# Patient Record
Sex: FEMALE | Race: WHITE | NOT HISPANIC OR LATINO | Employment: OTHER | URBAN - METROPOLITAN AREA
[De-identification: names, ages, dates, MRNs, and addresses within clinical notes are randomized per-mention and may not be internally consistent; named-entity substitution may affect disease eponyms.]

---

## 2023-06-08 LAB
ALANINE AMINOTRANSFERASE (SGPT) (U/L) IN SER/PLAS: 13 U/L (ref 7–45)
ALBUMIN (G/DL) IN SER/PLAS: 4.1 G/DL (ref 3.4–5)
ALKALINE PHOSPHATASE (U/L) IN SER/PLAS: 80 U/L (ref 33–136)
ANION GAP IN SER/PLAS: 12 MMOL/L (ref 10–20)
ASPARTATE AMINOTRANSFERASE (SGOT) (U/L) IN SER/PLAS: 16 U/L (ref 9–39)
BASOPHILS (10*3/UL) IN BLOOD BY AUTOMATED COUNT: 0.05 X10E9/L (ref 0–0.1)
BASOPHILS/100 LEUKOCYTES IN BLOOD BY AUTOMATED COUNT: 0.4 % (ref 0–2)
BILIRUBIN TOTAL (MG/DL) IN SER/PLAS: 1.2 MG/DL (ref 0–1.2)
CALCIDIOL (25 OH VITAMIN D3) (NG/ML) IN SER/PLAS: 51 NG/ML
CALCIUM (MG/DL) IN SER/PLAS: 9.8 MG/DL (ref 8.6–10.3)
CARBON DIOXIDE, TOTAL (MMOL/L) IN SER/PLAS: 32 MMOL/L (ref 21–32)
CHLORIDE (MMOL/L) IN SER/PLAS: 101 MMOL/L (ref 98–107)
CHOLESTEROL (MG/DL) IN SER/PLAS: 234 MG/DL (ref 0–199)
CHOLESTEROL IN HDL (MG/DL) IN SER/PLAS: 48.3 MG/DL
CHOLESTEROL/HDL RATIO: 4.8
COBALAMIN (VITAMIN B12) (PG/ML) IN SER/PLAS: 178 PG/ML (ref 211–911)
CREATININE (MG/DL) IN SER/PLAS: 0.57 MG/DL (ref 0.5–1.05)
EOSINOPHILS (10*3/UL) IN BLOOD BY AUTOMATED COUNT: 0.13 X10E9/L (ref 0–0.7)
EOSINOPHILS/100 LEUKOCYTES IN BLOOD BY AUTOMATED COUNT: 1 % (ref 0–6)
ERYTHROCYTE DISTRIBUTION WIDTH (RATIO) BY AUTOMATED COUNT: 13.3 % (ref 11.5–14.5)
ERYTHROCYTE MEAN CORPUSCULAR HEMOGLOBIN CONCENTRATION (G/DL) BY AUTOMATED: 32.1 G/DL (ref 32–36)
ERYTHROCYTE MEAN CORPUSCULAR VOLUME (FL) BY AUTOMATED COUNT: 95 FL (ref 80–100)
ERYTHROCYTES (10*6/UL) IN BLOOD BY AUTOMATED COUNT: 5.32 X10E12/L (ref 4–5.2)
FOLATE (NG/ML) IN SER/PLAS: 14.4 NG/ML
GFR FEMALE: >90 ML/MIN/1.73M2
GLUCOSE (MG/DL) IN SER/PLAS: 96 MG/DL (ref 74–99)
HEMATOCRIT (%) IN BLOOD BY AUTOMATED COUNT: 50.4 % (ref 36–46)
HEMOGLOBIN (G/DL) IN BLOOD: 16.2 G/DL (ref 12–16)
IMMATURE GRANULOCYTES/100 LEUKOCYTES IN BLOOD BY AUTOMATED COUNT: 0.4 % (ref 0–0.9)
LDL: 155 MG/DL (ref 0–99)
LEUKOCYTES (10*3/UL) IN BLOOD BY AUTOMATED COUNT: 12.6 X10E9/L (ref 4.4–11.3)
LYMPHOCYTES (10*3/UL) IN BLOOD BY AUTOMATED COUNT: 3.31 X10E9/L (ref 1.2–4.8)
LYMPHOCYTES/100 LEUKOCYTES IN BLOOD BY AUTOMATED COUNT: 26.3 % (ref 13–44)
MONOCYTES (10*3/UL) IN BLOOD BY AUTOMATED COUNT: 0.63 X10E9/L (ref 0.1–1)
MONOCYTES/100 LEUKOCYTES IN BLOOD BY AUTOMATED COUNT: 5 % (ref 2–10)
NEUTROPHILS (10*3/UL) IN BLOOD BY AUTOMATED COUNT: 8.41 X10E9/L (ref 1.2–7.7)
NEUTROPHILS/100 LEUKOCYTES IN BLOOD BY AUTOMATED COUNT: 66.9 % (ref 40–80)
PLATELETS (10*3/UL) IN BLOOD AUTOMATED COUNT: 358 X10E9/L (ref 150–450)
POTASSIUM (MMOL/L) IN SER/PLAS: 4.4 MMOL/L (ref 3.5–5.3)
PROTEIN TOTAL: 7.5 G/DL (ref 6.4–8.2)
SODIUM (MMOL/L) IN SER/PLAS: 141 MMOL/L (ref 136–145)
THYROTROPIN (MIU/L) IN SER/PLAS BY DETECTION LIMIT <= 0.05 MIU/L: 1.32 MIU/L (ref 0.44–3.98)
TRIGLYCERIDE (MG/DL) IN SER/PLAS: 156 MG/DL (ref 0–149)
UREA NITROGEN (MG/DL) IN SER/PLAS: 10 MG/DL (ref 6–23)
VLDL: 31 MG/DL (ref 0–40)

## 2023-07-11 ENCOUNTER — HOSPITAL ENCOUNTER (OUTPATIENT)
Dept: DATA CONVERSION | Facility: HOSPITAL | Age: 63
End: 2023-07-11
Attending: SURGERY | Admitting: SURGERY
Payer: COMMERCIAL

## 2023-07-11 DIAGNOSIS — D12.2 BENIGN NEOPLASM OF ASCENDING COLON: ICD-10-CM

## 2023-07-11 DIAGNOSIS — Z91.041 RADIOGRAPHIC DYE ALLERGY STATUS: ICD-10-CM

## 2023-07-11 DIAGNOSIS — I25.10 ATHEROSCLEROTIC HEART DISEASE OF NATIVE CORONARY ARTERY WITHOUT ANGINA PECTORIS: ICD-10-CM

## 2023-07-11 DIAGNOSIS — J44.9 CHRONIC OBSTRUCTIVE PULMONARY DISEASE, UNSPECIFIED (MULTI): ICD-10-CM

## 2023-07-11 DIAGNOSIS — K21.9 GASTRO-ESOPHAGEAL REFLUX DISEASE WITHOUT ESOPHAGITIS: ICD-10-CM

## 2023-07-11 DIAGNOSIS — E66.9 OBESITY, UNSPECIFIED: ICD-10-CM

## 2023-07-11 DIAGNOSIS — F17.200 NICOTINE DEPENDENCE, UNSPECIFIED, UNCOMPLICATED: ICD-10-CM

## 2023-07-11 DIAGNOSIS — Z12.11 ENCOUNTER FOR SCREENING FOR MALIGNANT NEOPLASM OF COLON: ICD-10-CM

## 2023-07-11 DIAGNOSIS — I10 ESSENTIAL (PRIMARY) HYPERTENSION: ICD-10-CM

## 2023-07-11 DIAGNOSIS — R19.4 CHANGE IN BOWEL HABIT: ICD-10-CM

## 2023-07-11 DIAGNOSIS — E78.5 HYPERLIPIDEMIA, UNSPECIFIED: ICD-10-CM

## 2023-07-14 LAB
COMPLETE PATHOLOGY REPORT: NORMAL
CONVERTED CLINICAL DIAGNOSIS-HISTORY: NORMAL
CONVERTED FINAL DIAGNOSIS: NORMAL
CONVERTED FINAL REPORT PDF LINK TO COPY AND PASTE: NORMAL
CONVERTED GROSS DESCRIPTION: NORMAL

## 2023-09-15 PROBLEM — D12.6 SERRATED ADENOMA OF COLON: Status: ACTIVE | Noted: 2023-09-15

## 2023-09-15 PROBLEM — E78.2 MIXED HYPERLIPIDEMIA: Status: ACTIVE | Noted: 2023-09-15

## 2023-09-15 PROBLEM — R19.4 CHANGE IN BOWEL HABITS: Status: ACTIVE | Noted: 2023-09-15

## 2023-09-15 PROBLEM — I25.84 CORONARY ARTERY CALCIFICATION: Status: ACTIVE | Noted: 2023-09-15

## 2023-09-15 PROBLEM — I25.10 CORONARY ARTERY CALCIFICATION: Status: ACTIVE | Noted: 2023-09-15

## 2023-09-15 PROBLEM — E66.9 CLASS 2 OBESITY WITH BODY MASS INDEX (BMI) OF 39.0 TO 39.9 IN ADULT: Status: ACTIVE | Noted: 2023-09-15

## 2023-09-15 PROBLEM — E66.812 CLASS 2 OBESITY WITH BODY MASS INDEX (BMI) OF 39.0 TO 39.9 IN ADULT: Status: ACTIVE | Noted: 2023-09-15

## 2023-09-15 PROBLEM — E78.5 HYPERLIPIDEMIA: Status: ACTIVE | Noted: 2023-09-15

## 2023-09-15 PROBLEM — R91.8 PULMONARY NODULES: Status: ACTIVE | Noted: 2023-09-15

## 2023-09-15 PROBLEM — E78.5 HYPERLIPIDEMIA WITH TARGET LDL LESS THAN 100: Status: ACTIVE | Noted: 2023-09-15

## 2023-09-15 PROBLEM — J43.9 MILD EMPHYSEMA (MULTI): Status: ACTIVE | Noted: 2023-09-15

## 2023-09-15 RX ORDER — PANTOPRAZOLE SODIUM 40 MG/1
40 TABLET, DELAYED RELEASE ORAL
COMMUNITY
End: 2024-03-01 | Stop reason: SDUPTHER

## 2023-09-15 RX ORDER — ERGOCALCIFEROL 1.25 MG/1
1.25 CAPSULE ORAL
COMMUNITY

## 2023-09-15 RX ORDER — LOSARTAN POTASSIUM AND HYDROCHLOROTHIAZIDE 25; 100 MG/1; MG/1
1 TABLET ORAL DAILY
COMMUNITY
End: 2024-02-01 | Stop reason: SDUPTHER

## 2023-09-15 RX ORDER — ROSUVASTATIN CALCIUM 10 MG/1
10 TABLET, COATED ORAL DAILY
COMMUNITY
End: 2023-11-08 | Stop reason: DRUGHIGH

## 2023-09-30 NOTE — H&P
History & Physical Reviewed:   I have reviewed the History and Physical dated:  11-Jul-2023   History and Physical reviewed and relevant findings noted. Patient examined to review pertinent physical  findings.: No significant changes   Home Medications Reviewed: no changes noted   Allergies Reviewed: no changes noted       ERAS (Enhanced Recovery After Surgery):  ·  ERAS Patient: no     Consent:   COVID-19 Consent:  ·  COVID-19 Risk Consent Surgeon has reviewed key risks related to the risk of jose j COVID-19 and if they contract COVID-19 what the risks are.       Electronic Signatures:  Maikol Morse)  (Signed 11-Jul-2023 10:28)   Authored: History & Physical Reviewed, ERAS, Consent,  Note Completion      Last Updated: 11-Jul-2023 10:28 by Maikol Morse)

## 2023-10-17 ENCOUNTER — APPOINTMENT (OUTPATIENT)
Dept: PRIMARY CARE | Facility: CLINIC | Age: 63
End: 2023-10-17
Payer: COMMERCIAL

## 2023-11-06 ENCOUNTER — LAB (OUTPATIENT)
Dept: LAB | Facility: LAB | Age: 63
End: 2023-11-06
Payer: COMMERCIAL

## 2023-11-06 ENCOUNTER — APPOINTMENT (OUTPATIENT)
Dept: PRIMARY CARE | Facility: CLINIC | Age: 63
End: 2023-11-06
Payer: COMMERCIAL

## 2023-11-06 DIAGNOSIS — Z80.6 FAMILY HISTORY OF LEUKEMIA: ICD-10-CM

## 2023-11-06 DIAGNOSIS — E78.5 HYPERLIPIDEMIA, UNSPECIFIED: Primary | ICD-10-CM

## 2023-11-06 DIAGNOSIS — E78.5 HYPERLIPIDEMIA, UNSPECIFIED: ICD-10-CM

## 2023-11-06 LAB
ALT SERPL W P-5'-P-CCNC: 10 U/L (ref 7–45)
AST SERPL W P-5'-P-CCNC: 11 U/L (ref 9–39)
BASOPHILS # BLD AUTO: 0.07 X10*3/UL (ref 0–0.1)
BASOPHILS NFR BLD AUTO: 0.5 %
CHOLEST SERPL-MCNC: 235 MG/DL (ref 0–199)
CHOLESTEROL/HDL RATIO: 5.2
EOSINOPHIL # BLD AUTO: 0.12 X10*3/UL (ref 0–0.7)
EOSINOPHIL NFR BLD AUTO: 0.9 %
ERYTHROCYTE [DISTWIDTH] IN BLOOD BY AUTOMATED COUNT: 13.5 % (ref 11.5–14.5)
HCT VFR BLD AUTO: 51.1 % (ref 36–46)
HDLC SERPL-MCNC: 45.3 MG/DL
HGB BLD-MCNC: 16.4 G/DL (ref 12–16)
IMM GRANULOCYTES # BLD AUTO: 0.06 X10*3/UL (ref 0–0.7)
IMM GRANULOCYTES NFR BLD AUTO: 0.4 % (ref 0–0.9)
LDLC SERPL CALC-MCNC: 150 MG/DL
LYMPHOCYTES # BLD AUTO: 3.19 X10*3/UL (ref 1.2–4.8)
LYMPHOCYTES NFR BLD AUTO: 22.9 %
MCH RBC QN AUTO: 30 PG (ref 26–34)
MCHC RBC AUTO-ENTMCNC: 32.1 G/DL (ref 32–36)
MCV RBC AUTO: 94 FL (ref 80–100)
MONOCYTES # BLD AUTO: 0.68 X10*3/UL (ref 0.1–1)
MONOCYTES NFR BLD AUTO: 4.9 %
NEUTROPHILS # BLD AUTO: 9.79 X10*3/UL (ref 1.2–7.7)
NEUTROPHILS NFR BLD AUTO: 70.4 %
NON HDL CHOLESTEROL: 190 MG/DL (ref 0–149)
NRBC BLD-RTO: 0 /100 WBCS (ref 0–0)
PLATELET # BLD AUTO: 346 X10*3/UL (ref 150–450)
RBC # BLD AUTO: 5.46 X10*6/UL (ref 4–5.2)
TRIGL SERPL-MCNC: 197 MG/DL (ref 0–149)
VLDL: 39 MG/DL (ref 0–40)
WBC # BLD AUTO: 13.9 X10*3/UL (ref 4.4–11.3)

## 2023-11-06 PROCEDURE — 80061 LIPID PANEL: CPT

## 2023-11-06 PROCEDURE — 36415 COLL VENOUS BLD VENIPUNCTURE: CPT

## 2023-11-06 PROCEDURE — 84460 ALANINE AMINO (ALT) (SGPT): CPT

## 2023-11-06 PROCEDURE — 85025 COMPLETE CBC W/AUTO DIFF WBC: CPT

## 2023-11-06 PROCEDURE — 84450 TRANSFERASE (AST) (SGOT): CPT

## 2023-11-07 PROBLEM — Z00.00 INITIAL MEDICARE ANNUAL WELLNESS VISIT: Status: ACTIVE | Noted: 2023-11-07

## 2023-11-07 PROBLEM — Z71.2 ENCOUNTER TO DISCUSS TEST RESULTS: Status: ACTIVE | Noted: 2023-11-07

## 2023-11-08 ENCOUNTER — OFFICE VISIT (OUTPATIENT)
Dept: PRIMARY CARE | Facility: CLINIC | Age: 63
End: 2023-11-08
Payer: COMMERCIAL

## 2023-11-08 VITALS
DIASTOLIC BLOOD PRESSURE: 70 MMHG | TEMPERATURE: 98 F | HEART RATE: 71 BPM | SYSTOLIC BLOOD PRESSURE: 120 MMHG | HEIGHT: 60 IN | WEIGHT: 204 LBS | OXYGEN SATURATION: 93 % | BODY MASS INDEX: 40.05 KG/M2

## 2023-11-08 DIAGNOSIS — R07.9 CHEST PAIN, UNSPECIFIED TYPE: ICD-10-CM

## 2023-11-08 DIAGNOSIS — F17.200 NICOTINE DEPENDENCE WITH CURRENT USE: ICD-10-CM

## 2023-11-08 DIAGNOSIS — Z71.2 ENCOUNTER TO DISCUSS TEST RESULTS: ICD-10-CM

## 2023-11-08 DIAGNOSIS — G89.29 CHRONIC PAIN OF RIGHT KNEE: ICD-10-CM

## 2023-11-08 DIAGNOSIS — Z76.0 ENCOUNTER FOR MEDICATION REFILL: ICD-10-CM

## 2023-11-08 DIAGNOSIS — Z00.00 INITIAL MEDICARE ANNUAL WELLNESS VISIT: Primary | ICD-10-CM

## 2023-11-08 DIAGNOSIS — E78.2 MIXED HYPERLIPIDEMIA: ICD-10-CM

## 2023-11-08 DIAGNOSIS — J01.00 ACUTE NON-RECURRENT MAXILLARY SINUSITIS: ICD-10-CM

## 2023-11-08 DIAGNOSIS — M25.561 CHRONIC PAIN OF RIGHT KNEE: ICD-10-CM

## 2023-11-08 DIAGNOSIS — J43.9 MILD EMPHYSEMA (MULTI): ICD-10-CM

## 2023-11-08 DIAGNOSIS — R94.31 ABNORMAL EKG: ICD-10-CM

## 2023-11-08 PROCEDURE — G0438 PPPS, INITIAL VISIT: HCPCS | Performed by: FAMILY MEDICINE

## 2023-11-08 PROCEDURE — 1170F FXNL STATUS ASSESSED: CPT | Performed by: FAMILY MEDICINE

## 2023-11-08 PROCEDURE — 99214 OFFICE O/P EST MOD 30 MIN: CPT | Performed by: FAMILY MEDICINE

## 2023-11-08 PROCEDURE — 1159F MED LIST DOCD IN RCRD: CPT | Performed by: FAMILY MEDICINE

## 2023-11-08 PROCEDURE — 3008F BODY MASS INDEX DOCD: CPT | Performed by: FAMILY MEDICINE

## 2023-11-08 RX ORDER — ROSUVASTATIN CALCIUM 5 MG/1
5 TABLET, COATED ORAL 3 TIMES WEEKLY
Qty: 12 TABLET | Refills: 5 | Status: SHIPPED | OUTPATIENT
Start: 2023-11-08 | End: 2024-05-06

## 2023-11-08 RX ORDER — ALBUTEROL SULFATE 90 UG/1
2 AEROSOL, METERED RESPIRATORY (INHALATION) EVERY 4 HOURS PRN
Qty: 8 G | Refills: 5 | Status: SHIPPED | OUTPATIENT
Start: 2023-11-08 | End: 2024-11-07

## 2023-11-08 RX ORDER — REGADENOSON 0.08 MG/ML
0.4 INJECTION, SOLUTION INTRAVENOUS
Status: CANCELLED | OUTPATIENT
Start: 2023-11-08

## 2023-11-08 RX ORDER — ROSUVASTATIN CALCIUM 5 MG/1
5 TABLET, COATED ORAL DAILY
Qty: 30 TABLET | Refills: 5 | Status: SHIPPED | OUTPATIENT
Start: 2023-11-08 | End: 2023-11-08 | Stop reason: SDUPTHER

## 2023-11-08 RX ORDER — AMOXICILLIN AND CLAVULANATE POTASSIUM 875; 125 MG/1; MG/1
875 TABLET, FILM COATED ORAL 2 TIMES DAILY
Qty: 20 TABLET | Refills: 0 | Status: SHIPPED | OUTPATIENT
Start: 2023-11-08 | End: 2023-11-18

## 2023-11-08 ASSESSMENT — ACTIVITIES OF DAILY LIVING (ADL)
MANAGING_FINANCES: INDEPENDENT
GROCERY_SHOPPING: INDEPENDENT
BATHING: INDEPENDENT
DRESSING: INDEPENDENT
DOING_HOUSEWORK: INDEPENDENT
TAKING_MEDICATION: INDEPENDENT

## 2023-11-08 ASSESSMENT — ENCOUNTER SYMPTOMS
OCCASIONAL FEELINGS OF UNSTEADINESS: 0
LOSS OF SENSATION IN FEET: 0
DEPRESSION: 0

## 2023-11-08 ASSESSMENT — PATIENT HEALTH QUESTIONNAIRE - PHQ9
1. LITTLE INTEREST OR PLEASURE IN DOING THINGS: NOT AT ALL
SUM OF ALL RESPONSES TO PHQ9 QUESTIONS 1 AND 2: 0
2. FEELING DOWN, DEPRESSED OR HOPELESS: NOT AT ALL

## 2023-11-08 NOTE — ASSESSMENT & PLAN NOTE
Ordering a Lexiscan test.  Patient is unable to walk on a treadmill due to knee arthritis and respiratory status.

## 2023-11-08 NOTE — PROGRESS NOTES
Subjective :  Chief Complaint: Joanne Quiroga is an 63 y.o. female here for an annual wellness visit and general medical care and f/u.     HPI:  Here for an initial welcome to medicare visit.  Recently had labs done and we will review these together.  Was on rosuvastatin for ~2 weeks and had joint and muscle pains.  Is willing to try a very low intermittent dose.  Ophthalmology apt needed.   Dental apt - has upper plates.  Smoking 6-7 cigarettes per day (1 pack per 3 days).  Started at 22 y.o.     Had smoked a pack a day until about a year ago.  Not ready to quit.  Would like a refill on albuterol medicine that she has used in the past.  Is also complaining of sinus pressure and congestion that has not improved.  No fever.  Complaining of brief episodes of left upper chest discomfort.  No diaphoresis, no dyspnea.  No specific activity related to symptoms.  No current chest pain.        Review of Systems  All systems reviewed and negative except for what was mentioned in the HPI    Objective   /70 (BP Location: Left arm, Patient Position: Sitting, BP Cuff Size: Large adult)   Pulse 71   Temp 36.7 °C (98 °F) (Temporal)   Ht 1.524 m (5')   Wt 92.5 kg (204 lb)   SpO2 93% Comment: RA  BMI 39.84 kg/m²     Physical Exam  General:  Alert, oriented, no acute distress  Eyes:  Sclerae white, PER, conjunctivae clear  ENT:  No nasal congestion.    Neck: Supple  Endocrine:  No thyromegaly. No thyroid nodes.   Respiratory:  Normal breath sounds.  No wheezing, rhonchi nor crackles.  No dyspnea.  Cardiovascular:  S1 and S2 positive.  Regular rate and rhythm.  No gallops.  No murmurs.  Vascular:  No edema.  Skin warm and dry.  CNS:  No gross neurological deficits.  Gait within normal limits.    Psychiatric:  Affect is positive and appropriate.  No depression.  No anxiety.    Imaging:  No results found.     Labs reviewed:    Lab Results   Component Value Date    WBC 13.9 (H) 11/06/2023    RBC 5.46 (H) 11/06/2023    HGB  16.4 (H) 11/06/2023    HCT 51.1 (H) 11/06/2023    MCV 94 11/06/2023     11/06/2023    CHOL 235 (H) 11/06/2023    TRIG 197 (H) 11/06/2023    HDL 45.3 11/06/2023    ALT 10 11/06/2023    AST 11 11/06/2023     06/08/2023    K 4.4 06/08/2023     06/08/2023    CREATININE 0.57 06/08/2023    BUN 10 06/08/2023    CO2 32 06/08/2023    TSH 1.32 06/08/2023       Past Medical, Surgical, and Family History reviewed and updated in chart.    I have reviewed and reconciled the medication list with the patient today.   Current Outpatient Medications:     ergocalciferol (Vitamin D-2) 1.25 MG (01122 UT) capsule, Take 1 capsule (1,250 mcg) by mouth 1 (one) time per week., Disp: , Rfl:     losartan-hydrochlorothiazide (Hyzaar) 100-25 mg tablet, Take 1 tablet by mouth once daily., Disp: , Rfl:     pantoprazole (ProtoNix) 40 mg EC tablet, Take 1 tablet (40 mg) by mouth 2 times a day before meals. Do not crush, chew, or split. 30 minutes before meals, Disp: , Rfl:     vit B complex no.12/niacin,B3, (VITAMIN B COMPLEX NO.12-NIACIN ORAL), Take 1 tablet by mouth once daily., Disp: , Rfl:     albuterol (Ventolin HFA) 90 mcg/actuation inhaler, Inhale 2 puffs every 4 hours if needed for wheezing or shortness of breath., Disp: 8 g, Rfl: 5    amoxicillin-pot clavulanate (Augmentin) 875-125 mg tablet, Take 1 tablet (875 mg) by mouth 2 times a day for 10 days., Disp: 20 tablet, Rfl: 0    rosuvastatin (Crestor) 5 mg tablet, Take 1 tablet (5 mg) by mouth 3 times a week., Disp: 12 tablet, Rfl: 5     List of current healthcare providers:  Patient Care Team:  Rosita Singh MD as PCP - General     Assessment/Plan :  Problem List Items Addressed This Visit       Abnormal EKG     Ordering a Lexiscan test.  Patient is unable to walk on a treadmill due to knee arthritis and respiratory status.         Relevant Orders    CBC and Auto Differential    Lipid Panel    Acute non-recurrent maxillary sinusitis     Antibiotic ordered.          Relevant Medications    amoxicillin-pot clavulanate (Augmentin) 875-125 mg tablet    BMI 39.0-39.9,adult    Relevant Orders    Vitamin D 25-Hydroxy,Total (for eval of Vitamin D levels)    Chest pain     Intermittent symptoms.  Ordering a stress test.         Relevant Orders    Nuclear Stress Test    Chronic pain of right knee     No change in management at this time.           Encounter for medication refill     Med reviewed.  Refill given.         Encounter to discuss test results     Reviewed lab/testing results with the patient and provided patient education regarding the results.             Initial Medicare annual wellness visit - Primary     Age appropriate preventive measures reviewed and discussed.  Diet and exercise recommendations discussed.             Mild emphysema (CMS/HCC)     Strongly encouraged smoking cessation.         Relevant Medications    albuterol (Ventolin HFA) 90 mcg/actuation inhaler    Mixed hyperlipidemia     Was unable to tolerate 10 mg rosuvastatin daily.  Will restart at a lower dose.         Relevant Medications    rosuvastatin (Crestor) 5 mg tablet    Other Relevant Orders    Comprehensive Metabolic Panel    Lipid Panel    Nicotine dependence with current use     Strongly encouraged patient to stop smoking.  She is contemplating this.          The following health maintenance schedule was reviewed with the patient and provided in printed form in the after visit summary:  Health Maintenance   Topic Date Due    Medicare Initial Physical (IPPE)  Never done    Bone Density Scan  Never done    MMR Vaccines (1 of 1 - Standard series) Never done    Hepatitis C Screening  Never done    Diabetes Screening  Never done    Cervical Cancer Screening  Never done    DTaP/Tdap/Td Vaccines (1 - Tdap) 03/12/2010    Pneumococcal Vaccine: 65+ Years (2 - PPSV23 or PCV20) 08/28/2021    COVID-19 Vaccine (2 - Pfizer risk series) 01/27/2023    Influenza Vaccine (1) 09/01/2023    Mammogram  06/12/2024     Lipid Panel  11/06/2028    Colorectal Cancer Screening  07/11/2033    Zoster Vaccines  Completed    HIB Vaccines  Aged Out    Hepatitis B Vaccines  Aged Out    IPV Vaccines  Aged Out    Hepatitis A Vaccines  Aged Out    Meningococcal Vaccine  Aged Out    Rotavirus Vaccines  Aged Out    HPV Vaccines  Aged Out    Irritable Bowel Syndrome  Discontinued       Advance Care Planning   No living will or POA.        Reviewed lab/testing results with the patient and provided patient education regarding the results.  Continue other medications as listed  Follow up in 6 months.  Will order stress test at this time.

## 2023-11-08 NOTE — ASSESSMENT & PLAN NOTE
Reviewed lab/testing results with the patient and provided patient education regarding the results.

## 2023-11-08 NOTE — ASSESSMENT & PLAN NOTE
Age appropriate preventive measures reviewed and discussed.  Diet and exercise recommendations discussed.

## 2023-11-14 ENCOUNTER — TELEPHONE (OUTPATIENT)
Dept: PRIMARY CARE | Facility: CLINIC | Age: 63
End: 2023-11-14
Payer: COMMERCIAL

## 2023-11-14 NOTE — TELEPHONE ENCOUNTER
Called and spoke with patient, she advised she already knew the results and had a follow up appt Dr. Tay, she was actually just wanting the results of the colonoscopy.

## 2023-11-14 NOTE — TELEPHONE ENCOUNTER
----- Message from Rosita Singh MD sent at 11/8/2023  3:38 PM EST -----  Patient wanted to know her colonoscopy results.  Done in July - please let patient know that she had polyps and Dr. Tay wanted to have a follow up apt with her.  I don't see the pathology results so she should call his office and ask when she is supposed to have her next colonoscopy.

## 2023-11-17 ENCOUNTER — TELEPHONE (OUTPATIENT)
Dept: PRIMARY CARE | Facility: CLINIC | Age: 63
End: 2023-11-17
Payer: COMMERCIAL

## 2023-11-17 DIAGNOSIS — J01.00 ACUTE NON-RECURRENT MAXILLARY SINUSITIS: Primary | ICD-10-CM

## 2023-11-17 RX ORDER — AZITHROMYCIN 250 MG/1
TABLET, FILM COATED ORAL
Qty: 6 TABLET | Refills: 0 | Status: SHIPPED | OUTPATIENT
Start: 2023-11-17 | End: 2023-11-22

## 2023-11-17 NOTE — TELEPHONE ENCOUNTER
Patient called office advised that she has one day left of her Augmentin that was given to her at her last OV for a sinus infection and he has not had any changes with her sx's and she would like something different sent to her local pharmacy in chart. MJ Scott.

## 2023-12-21 ENCOUNTER — ANCILLARY PROCEDURE (OUTPATIENT)
Dept: RADIOLOGY | Facility: CLINIC | Age: 63
End: 2023-12-21
Payer: COMMERCIAL

## 2023-12-21 ENCOUNTER — HOSPITAL ENCOUNTER (OUTPATIENT)
Dept: CARDIOLOGY | Facility: CLINIC | Age: 63
Discharge: HOME | End: 2023-12-21
Payer: COMMERCIAL

## 2023-12-21 DIAGNOSIS — R07.9 CHEST PAIN, UNSPECIFIED TYPE: Primary | ICD-10-CM

## 2023-12-21 DIAGNOSIS — R07.9 CHEST PAIN, UNSPECIFIED TYPE: ICD-10-CM

## 2023-12-21 PROCEDURE — A9502 TC99M TETROFOSMIN: HCPCS | Performed by: FAMILY MEDICINE

## 2023-12-21 PROCEDURE — 93017 CV STRESS TEST TRACING ONLY: CPT

## 2023-12-21 PROCEDURE — 2500000004 HC RX 250 GENERAL PHARMACY W/ HCPCS (ALT 636 FOR OP/ED): Performed by: FAMILY MEDICINE

## 2023-12-21 PROCEDURE — 78452 HT MUSCLE IMAGE SPECT MULT: CPT

## 2023-12-21 PROCEDURE — 3430000001 HC RX 343 DIAGNOSTIC RADIOPHARMACEUTICALS: Performed by: FAMILY MEDICINE

## 2023-12-21 RX ORDER — REGADENOSON 0.08 MG/ML
0.4 INJECTION, SOLUTION INTRAVENOUS ONCE
Status: COMPLETED | OUTPATIENT
Start: 2023-12-21 | End: 2023-12-21

## 2023-12-21 RX ADMIN — TETROFOSMIN 34.3 MILLICURIE: 0.23 INJECTION, POWDER, LYOPHILIZED, FOR SOLUTION INTRAVENOUS at 10:36

## 2023-12-21 RX ADMIN — REGADENOSON 0.4 MG: 0.08 INJECTION, SOLUTION INTRAVENOUS at 10:35

## 2023-12-22 ENCOUNTER — ANCILLARY PROCEDURE (OUTPATIENT)
Dept: RADIOLOGY | Facility: CLINIC | Age: 63
End: 2023-12-22
Payer: COMMERCIAL

## 2023-12-22 PROCEDURE — A9502 TC99M TETROFOSMIN: HCPCS | Performed by: FAMILY MEDICINE

## 2023-12-22 PROCEDURE — 93016 CV STRESS TEST SUPVJ ONLY: CPT | Performed by: INTERNAL MEDICINE

## 2023-12-22 PROCEDURE — 93018 CV STRESS TEST I&R ONLY: CPT | Performed by: INTERNAL MEDICINE

## 2023-12-22 PROCEDURE — 78452 HT MUSCLE IMAGE SPECT MULT: CPT | Performed by: INTERNAL MEDICINE

## 2023-12-22 PROCEDURE — 3430000001 HC RX 343 DIAGNOSTIC RADIOPHARMACEUTICALS: Performed by: FAMILY MEDICINE

## 2023-12-22 RX ADMIN — TETROFOSMIN 35.4 MILLICURIE: 0.23 INJECTION, POWDER, LYOPHILIZED, FOR SOLUTION INTRAVENOUS at 10:28

## 2024-02-01 DIAGNOSIS — I10 ESSENTIAL HYPERTENSION: Primary | ICD-10-CM

## 2024-02-02 RX ORDER — LOSARTAN POTASSIUM AND HYDROCHLOROTHIAZIDE 25; 100 MG/1; MG/1
1 TABLET ORAL DAILY
Qty: 90 TABLET | Refills: 1 | Status: SHIPPED | OUTPATIENT
Start: 2024-02-02

## 2024-03-01 DIAGNOSIS — K21.9 GASTROESOPHAGEAL REFLUX DISEASE WITHOUT ESOPHAGITIS: Primary | ICD-10-CM

## 2024-03-01 RX ORDER — PANTOPRAZOLE SODIUM 40 MG/1
40 TABLET, DELAYED RELEASE ORAL
Qty: 180 TABLET | Refills: 0 | Status: CANCELLED | OUTPATIENT
Start: 2024-03-01

## 2024-03-01 RX ORDER — PANTOPRAZOLE SODIUM 40 MG/1
40 TABLET, DELAYED RELEASE ORAL
Qty: 90 TABLET | Refills: 0 | Status: SHIPPED | OUTPATIENT
Start: 2024-03-01

## 2024-03-04 RX ORDER — ERGOCALCIFEROL 1.25 MG/1
1.25 CAPSULE ORAL 2 TIMES WEEKLY
Qty: 8 CAPSULE | Refills: 1 | OUTPATIENT
Start: 2024-03-04

## 2024-06-19 DIAGNOSIS — Z87.891 FORMER SMOKER: Primary | ICD-10-CM

## 2024-08-05 DIAGNOSIS — E78.2 MIXED HYPERLIPIDEMIA: Primary | ICD-10-CM

## 2024-08-05 DIAGNOSIS — I10 ESSENTIAL HYPERTENSION: ICD-10-CM

## 2024-08-05 DIAGNOSIS — J43.9 MILD EMPHYSEMA (MULTI): ICD-10-CM

## 2024-08-05 DIAGNOSIS — Z00.00 HEALTHCARE MAINTENANCE: ICD-10-CM

## 2024-08-05 DIAGNOSIS — F17.200 NICOTINE DEPENDENCE WITH CURRENT USE: ICD-10-CM

## 2024-08-08 ENCOUNTER — APPOINTMENT (OUTPATIENT)
Dept: RADIOLOGY | Facility: CLINIC | Age: 64
End: 2024-08-08
Payer: COMMERCIAL

## 2024-08-19 ENCOUNTER — TELEPHONE (OUTPATIENT)
Dept: PRIMARY CARE | Facility: CLINIC | Age: 64
End: 2024-08-19

## 2024-08-19 ENCOUNTER — LAB (OUTPATIENT)
Dept: LAB | Facility: LAB | Age: 64
End: 2024-08-19
Payer: COMMERCIAL

## 2024-08-19 DIAGNOSIS — I10 ESSENTIAL HYPERTENSION: ICD-10-CM

## 2024-08-19 DIAGNOSIS — F17.200 NICOTINE DEPENDENCE WITH CURRENT USE: ICD-10-CM

## 2024-08-19 DIAGNOSIS — E78.2 MIXED HYPERLIPIDEMIA: ICD-10-CM

## 2024-08-19 DIAGNOSIS — R94.31 ABNORMAL EKG: ICD-10-CM

## 2024-08-19 DIAGNOSIS — Z00.00 HEALTHCARE MAINTENANCE: ICD-10-CM

## 2024-08-19 LAB
25(OH)D3 SERPL-MCNC: 33 NG/ML (ref 30–100)
ALBUMIN SERPL BCP-MCNC: 3.9 G/DL (ref 3.4–5)
ALP SERPL-CCNC: 74 U/L (ref 33–136)
ALT SERPL W P-5'-P-CCNC: 10 U/L (ref 7–45)
ANION GAP SERPL CALC-SCNC: 13 MMOL/L (ref 10–20)
AST SERPL W P-5'-P-CCNC: 14 U/L (ref 9–39)
BASOPHILS # BLD AUTO: 0.07 X10*3/UL (ref 0–0.1)
BASOPHILS NFR BLD AUTO: 0.6 %
BILIRUB SERPL-MCNC: 0.9 MG/DL (ref 0–1.2)
BUN SERPL-MCNC: 10 MG/DL (ref 6–23)
CALCIUM SERPL-MCNC: 9.4 MG/DL (ref 8.6–10.3)
CHLORIDE SERPL-SCNC: 102 MMOL/L (ref 98–107)
CHOLEST SERPL-MCNC: 220 MG/DL (ref 0–199)
CHOLESTEROL/HDL RATIO: 5.1
CO2 SERPL-SCNC: 31 MMOL/L (ref 21–32)
CREAT SERPL-MCNC: 0.55 MG/DL (ref 0.5–1.05)
EGFRCR SERPLBLD CKD-EPI 2021: >90 ML/MIN/1.73M*2
EOSINOPHIL # BLD AUTO: 0.14 X10*3/UL (ref 0–0.7)
EOSINOPHIL NFR BLD AUTO: 1.2 %
ERYTHROCYTE [DISTWIDTH] IN BLOOD BY AUTOMATED COUNT: 13.6 % (ref 11.5–14.5)
GLUCOSE SERPL-MCNC: 81 MG/DL (ref 74–99)
HCT VFR BLD AUTO: 50.4 % (ref 36–46)
HDLC SERPL-MCNC: 43.1 MG/DL
HGB BLD-MCNC: 16.4 G/DL (ref 12–16)
IMM GRANULOCYTES # BLD AUTO: 0.04 X10*3/UL (ref 0–0.7)
IMM GRANULOCYTES NFR BLD AUTO: 0.3 % (ref 0–0.9)
LDLC SERPL CALC-MCNC: 138 MG/DL
LYMPHOCYTES # BLD AUTO: 3.22 X10*3/UL (ref 1.2–4.8)
LYMPHOCYTES NFR BLD AUTO: 28.1 %
MCH RBC QN AUTO: 30.5 PG (ref 26–34)
MCHC RBC AUTO-ENTMCNC: 32.5 G/DL (ref 32–36)
MCV RBC AUTO: 94 FL (ref 80–100)
MONOCYTES # BLD AUTO: 0.6 X10*3/UL (ref 0.1–1)
MONOCYTES NFR BLD AUTO: 5.2 %
NEUTROPHILS # BLD AUTO: 7.39 X10*3/UL (ref 1.2–7.7)
NEUTROPHILS NFR BLD AUTO: 64.6 %
NON HDL CHOLESTEROL: 177 MG/DL (ref 0–149)
NRBC BLD-RTO: 0 /100 WBCS (ref 0–0)
PLATELET # BLD AUTO: 316 X10*3/UL (ref 150–450)
POTASSIUM SERPL-SCNC: 4.3 MMOL/L (ref 3.5–5.3)
PROT SERPL-MCNC: 6.7 G/DL (ref 6.4–8.2)
RBC # BLD AUTO: 5.37 X10*6/UL (ref 4–5.2)
SODIUM SERPL-SCNC: 142 MMOL/L (ref 136–145)
TRIGL SERPL-MCNC: 197 MG/DL (ref 0–149)
TSH SERPL-ACNC: 1.03 MIU/L (ref 0.44–3.98)
VLDL: 39 MG/DL (ref 0–40)
WBC # BLD AUTO: 11.5 X10*3/UL (ref 4.4–11.3)

## 2024-08-19 PROCEDURE — 36415 COLL VENOUS BLD VENIPUNCTURE: CPT

## 2024-08-19 PROCEDURE — 84443 ASSAY THYROID STIM HORMONE: CPT

## 2024-08-19 PROCEDURE — 85025 COMPLETE CBC W/AUTO DIFF WBC: CPT

## 2024-08-19 PROCEDURE — 86803 HEPATITIS C AB TEST: CPT

## 2024-08-19 PROCEDURE — 82306 VITAMIN D 25 HYDROXY: CPT

## 2024-08-19 PROCEDURE — 80061 LIPID PANEL: CPT

## 2024-08-19 PROCEDURE — 80053 COMPREHEN METABOLIC PANEL: CPT

## 2024-08-20 DIAGNOSIS — K21.9 GASTROESOPHAGEAL REFLUX DISEASE WITHOUT ESOPHAGITIS: ICD-10-CM

## 2024-08-20 DIAGNOSIS — I10 ESSENTIAL HYPERTENSION: ICD-10-CM

## 2024-08-20 DIAGNOSIS — E78.2 MIXED HYPERLIPIDEMIA: ICD-10-CM

## 2024-08-20 LAB — HCV AB SER QL: NONREACTIVE

## 2024-08-20 RX ORDER — PANTOPRAZOLE SODIUM 40 MG/1
40 TABLET, DELAYED RELEASE ORAL
Qty: 90 TABLET | Refills: 0 | OUTPATIENT
Start: 2024-08-20

## 2024-08-20 RX ORDER — EZETIMIBE 10 MG/1
10 TABLET ORAL DAILY
Qty: 30 TABLET | Refills: 5 | Status: SHIPPED | OUTPATIENT
Start: 2024-08-20 | End: 2025-02-16

## 2024-08-20 RX ORDER — ROSUVASTATIN CALCIUM 5 MG/1
5 TABLET, COATED ORAL DAILY
Qty: 90 TABLET | Refills: 1 | Status: SHIPPED | OUTPATIENT
Start: 2024-08-20 | End: 2025-02-16

## 2024-08-20 RX ORDER — LOSARTAN POTASSIUM AND HYDROCHLOROTHIAZIDE 25; 100 MG/1; MG/1
1 TABLET ORAL DAILY
Qty: 90 TABLET | Refills: 1 | OUTPATIENT
Start: 2024-08-20

## 2024-08-20 NOTE — RESULT ENCOUNTER NOTE
Please let Joanne know her labs were fine except her cholesterol is still elevated.  It is better than last time but if she is willing, she can take 10 mg daily of the rosuvastatin instead of the 5 mg.  I can order a new Rx for her but she can double what she is taking.  Please let me know if she has questions.

## 2024-08-20 NOTE — RESULT ENCOUNTER NOTE
Correction - I see she was only taking the 5 mg tablet 3 days a week.  If she felt fine on it I would recommend that she take it daily.  It is still a low dose but looks like it would work well for her.  I will order refills for her.  Please let me know if she has questions.

## 2024-08-21 RX ORDER — LOSARTAN POTASSIUM AND HYDROCHLOROTHIAZIDE 25; 100 MG/1; MG/1
1 TABLET ORAL DAILY
Qty: 90 TABLET | Refills: 0 | Status: SHIPPED | OUTPATIENT
Start: 2024-08-21

## 2024-08-21 RX ORDER — PANTOPRAZOLE SODIUM 40 MG/1
40 TABLET, DELAYED RELEASE ORAL
Qty: 90 TABLET | Refills: 0 | Status: SHIPPED | OUTPATIENT
Start: 2024-08-21

## 2024-09-04 ENCOUNTER — TELEPHONE (OUTPATIENT)
Dept: PRIMARY CARE | Facility: CLINIC | Age: 64
End: 2024-09-04
Payer: COMMERCIAL

## 2024-09-04 NOTE — TELEPHONE ENCOUNTER
----- Message from Rosita Singh sent at 8/30/2024  8:27 AM EDT -----  Please ask patient who had prescribed her stomach medication for 2 times a day.  Usually she can use that medication once a day.  If her symptoms are controlled, I would like her to try and use it once a day.  If a GI doctor told her twice a day, please let me know.  ----- Message -----  From: Sena Rivera  Sent: 8/29/2024   5:21 PM EDT  To: Rosita Singh MD

## 2024-09-10 ENCOUNTER — APPOINTMENT (OUTPATIENT)
Dept: RADIOLOGY | Facility: CLINIC | Age: 64
End: 2024-09-10
Payer: COMMERCIAL

## 2024-10-30 ENCOUNTER — APPOINTMENT (OUTPATIENT)
Dept: PRIMARY CARE | Facility: CLINIC | Age: 64
End: 2024-10-30
Payer: COMMERCIAL

## 2024-10-30 VITALS
DIASTOLIC BLOOD PRESSURE: 66 MMHG | TEMPERATURE: 98 F | HEART RATE: 73 BPM | WEIGHT: 206 LBS | BODY MASS INDEX: 40.44 KG/M2 | OXYGEN SATURATION: 93 % | HEIGHT: 60 IN | SYSTOLIC BLOOD PRESSURE: 118 MMHG

## 2024-10-30 DIAGNOSIS — I10 ESSENTIAL HYPERTENSION: ICD-10-CM

## 2024-10-30 DIAGNOSIS — Z78.9 STATIN INTOLERANCE: Primary | ICD-10-CM

## 2024-10-30 DIAGNOSIS — Z76.0 ENCOUNTER FOR MEDICATION REFILL: ICD-10-CM

## 2024-10-30 DIAGNOSIS — E78.2 MIXED HYPERLIPIDEMIA: ICD-10-CM

## 2024-10-30 DIAGNOSIS — Z12.31 ENCOUNTER FOR SCREENING MAMMOGRAM FOR BREAST CANCER: ICD-10-CM

## 2024-10-30 DIAGNOSIS — M85.80 OSTEOPENIA AFTER MENOPAUSE: ICD-10-CM

## 2024-10-30 DIAGNOSIS — K21.9 GASTROESOPHAGEAL REFLUX DISEASE WITHOUT ESOPHAGITIS: ICD-10-CM

## 2024-10-30 DIAGNOSIS — Z78.0 OSTEOPENIA AFTER MENOPAUSE: ICD-10-CM

## 2024-10-30 PROCEDURE — 3008F BODY MASS INDEX DOCD: CPT | Performed by: FAMILY MEDICINE

## 2024-10-30 PROCEDURE — 3074F SYST BP LT 130 MM HG: CPT | Performed by: FAMILY MEDICINE

## 2024-10-30 PROCEDURE — 99214 OFFICE O/P EST MOD 30 MIN: CPT | Performed by: FAMILY MEDICINE

## 2024-10-30 PROCEDURE — 3078F DIAST BP <80 MM HG: CPT | Performed by: FAMILY MEDICINE

## 2024-10-30 PROCEDURE — 1159F MED LIST DOCD IN RCRD: CPT | Performed by: FAMILY MEDICINE

## 2024-10-30 RX ORDER — LOSARTAN POTASSIUM AND HYDROCHLOROTHIAZIDE 25; 100 MG/1; MG/1
1 TABLET ORAL DAILY
Qty: 90 TABLET | Refills: 1 | Status: SHIPPED | OUTPATIENT
Start: 2024-10-30

## 2024-10-30 RX ORDER — FAMOTIDINE 20 MG/1
20 TABLET, FILM COATED ORAL 2 TIMES DAILY PRN
Qty: 60 TABLET | Refills: 5 | Status: SHIPPED | OUTPATIENT
Start: 2024-10-30 | End: 2025-04-28

## 2024-10-30 ASSESSMENT — PATIENT HEALTH QUESTIONNAIRE - PHQ9
10. IF YOU CHECKED OFF ANY PROBLEMS, HOW DIFFICULT HAVE THESE PROBLEMS MADE IT FOR YOU TO DO YOUR WORK, TAKE CARE OF THINGS AT HOME, OR GET ALONG WITH OTHER PEOPLE: NOT DIFFICULT AT ALL
SUM OF ALL RESPONSES TO PHQ9 QUESTIONS 1 AND 2: 1
2. FEELING DOWN, DEPRESSED OR HOPELESS: SEVERAL DAYS
1. LITTLE INTEREST OR PLEASURE IN DOING THINGS: NOT AT ALL

## 2024-11-04 ENCOUNTER — APPOINTMENT (OUTPATIENT)
Dept: RADIOLOGY | Facility: HOSPITAL | Age: 64
End: 2024-11-04
Payer: COMMERCIAL

## 2024-11-14 ENCOUNTER — APPOINTMENT (OUTPATIENT)
Dept: CARDIOLOGY | Facility: CLINIC | Age: 64
End: 2024-11-14
Payer: COMMERCIAL

## 2024-11-19 ENCOUNTER — APPOINTMENT (OUTPATIENT)
Dept: CARDIOLOGY | Facility: CLINIC | Age: 64
End: 2024-11-19
Payer: COMMERCIAL

## 2024-11-19 VITALS
HEIGHT: 59 IN | DIASTOLIC BLOOD PRESSURE: 80 MMHG | WEIGHT: 206.2 LBS | BODY MASS INDEX: 41.57 KG/M2 | HEART RATE: 72 BPM | SYSTOLIC BLOOD PRESSURE: 126 MMHG

## 2024-11-19 DIAGNOSIS — F17.200 NICOTINE DEPENDENCE WITH CURRENT USE: ICD-10-CM

## 2024-11-19 DIAGNOSIS — I10 ESSENTIAL HYPERTENSION: ICD-10-CM

## 2024-11-19 DIAGNOSIS — Z76.89 ENCOUNTER TO ESTABLISH CARE WITH NEW DOCTOR: ICD-10-CM

## 2024-11-19 DIAGNOSIS — I25.10 CORONARY ARTERY CALCIFICATION: ICD-10-CM

## 2024-11-19 DIAGNOSIS — E78.2 MIXED HYPERLIPIDEMIA: ICD-10-CM

## 2024-11-19 DIAGNOSIS — Z78.9 STATIN INTOLERANCE: ICD-10-CM

## 2024-11-19 PROCEDURE — 3074F SYST BP LT 130 MM HG: CPT | Performed by: INTERNAL MEDICINE

## 2024-11-19 PROCEDURE — 93000 ELECTROCARDIOGRAM COMPLETE: CPT | Performed by: INTERNAL MEDICINE

## 2024-11-19 PROCEDURE — 3008F BODY MASS INDEX DOCD: CPT | Performed by: INTERNAL MEDICINE

## 2024-11-19 PROCEDURE — 3079F DIAST BP 80-89 MM HG: CPT | Performed by: INTERNAL MEDICINE

## 2024-11-19 PROCEDURE — 99204 OFFICE O/P NEW MOD 45 MIN: CPT | Performed by: INTERNAL MEDICINE

## 2024-11-19 PROCEDURE — 1159F MED LIST DOCD IN RCRD: CPT | Performed by: INTERNAL MEDICINE

## 2024-11-19 RX ORDER — PNV NO.95/FERROUS FUM/FOLIC AC 28MG-0.8MG
100 TABLET ORAL DAILY
COMMUNITY

## 2024-11-19 RX ORDER — ASCORBIC ACID 500 MG
500 TABLET ORAL DAILY
COMMUNITY

## 2024-11-19 NOTE — PATIENT INSTRUCTIONS
You will be scheduled for a CT coronary calcium score  Patient to follow up after testing.    Continue same medications/treatment.  Patient educated on proper medication use.  Patient educated on risk factor modification.  Please bring any lab results from other providers / physicians to your next appointment.    Please bring all medicines, vitamins and herbal supplements with you when you come to the office.    Prescriptions will not be filled unless you are compliant with your follow up appointments or have a follow up  appointment scheduled as per instruction of your physician.  Refills should be requested at the time of  Your visit.    Neeta HOPKINS LPN, am scribing for and in the presence of  Dr. Jeanne Jackson MD, FACC

## 2024-11-19 NOTE — PROGRESS NOTES
Referred by Thu Gonzalez* provider found for   Chief Complaint   Patient presents with    New Patient Visit     Pt is here to establish care for hyperlipidemia, referred per pcp, Dr. Franklin.        History of Present Illness  Joanne Quiroga is a 64 y.o. year old female patient history of tobacco abuse and positive family history of premature coronary disease and history of hyperlipidemia.  Her LDL about 138 was started on statin but she could not tolerate that she has to stop.  The statin was made either Lipitor or Crestor.  From a cardiac standpoint did not have any previous cardiac events.  EKG shows sinus rhythm and no acute changes.  She had no symptoms of chest pain or shortness of breath.  I discussed with the patient in length that we need to address her blood cholesterol based on underlying heart disease.  Will obtain calcium scoring to decide how much atherosclerotic disease she has and what her target cholesterol should be.  Based on that further recommendation will be made    Past Medical History  Past Medical History:   Diagnosis Date    Hypertension     Unspecified asthma, uncomplicated ([facility]) 01/07/2016    Acute asthmatic bronchitis       Social History  Social History     Tobacco Use    Smoking status: Former     Current packs/day: 0.25     Average packs/day: 0.3 packs/day for 15.0 years (3.8 ttl pk-yrs)     Types: Cigarettes    Smokeless tobacco: Current   Vaping Use    Vaping status: Never Used   Substance Use Topics    Alcohol use: Never    Drug use: Never       Family History     Family History   Problem Relation Name Age of Onset    Diabetes Mother Lacho kauffmna     Heart disease Mother Lacho kauffman     Heart failure Mother Lacho kauffman     Diabetes type I Mother Lacho kauffman     Leukemia Sister James kauffman     Cancer Sister James kauffman     Diabetes Brother 1     Leukemia Brother 1     Lung cancer Brother      Cancer Sister Muna rene     Diabetes type I Sister Muna  edgard     Diabetes type I Sister Miladis Snyder        Review of Systems  As per HPI, all other systems reviewed and negative.    Allergies:  Allergies   Allergen Reactions    Sulfamethoxazole-Trimethoprim Hives and Nausea Only    Aspirin GI Upset     severe    Iodinated Contrast Media Unknown    Rosuvastatin Myalgia     Patient has tried 2 different doses and has had the same reaction    Zetia [Ezetimibe] Myalgia        Outpatient Medications:  Current Outpatient Medications   Medication Instructions    albuterol (Ventolin HFA) 90 mcg/actuation inhaler 2 puffs, inhalation, Every 4 hours PRN    ascorbic acid (VITAMIN C) 500 mg, Daily    cyanocobalamin (VITAMIN B-12) 100 mcg, Daily    famotidine (PEPCID) 20 mg, oral, 2 times daily PRN    losartan-hydrochlorothiazide (Hyzaar) 100-25 mg tablet 1 tablet, oral, Daily    pantoprazole (PROTONIX) 40 mg, oral, 2 times daily before meals, Do not crush, chew, or split. 30 minutes before meals         Vitals:  Vitals:    11/19/24 1112   BP: 126/80   Pulse: 72       Physical Exam:  Physical Exam  Vitals and nursing note reviewed.   Constitutional:       Appearance: Normal appearance.   HENT:      Head: Normocephalic.   Eyes:      Pupils: Pupils are equal, round, and reactive to light.   Cardiovascular:      Rate and Rhythm: Normal rate and regular rhythm.      Pulses: Normal pulses.      Heart sounds: Normal heart sounds.   Pulmonary:      Effort: Pulmonary effort is normal.      Breath sounds: Normal breath sounds.   Musculoskeletal:         General: Normal range of motion.      Cervical back: Normal range of motion.   Skin:     General: Skin is dry.   Neurological:      General: No focal deficit present.      Mental Status: She is alert and oriented to person, place, and time.   Psychiatric:         Behavior: Behavior is cooperative.             Assessment/Plan   Diagnoses and all orders for this visit:  Statin intolerance  -     Referral to Cardiology  Mixed hyperlipidemia  -      Referral to Cardiology  Encounter to establish care with new doctor  Coronary artery calcification  Essential hypertension  BMI 40.0-44.9, adult (Multi)  Nicotine dependence with current use          Jeanne Jackson MD [facility]  Interventional Cardiology   of [facility]     Thank you for allowing me to participate in the care of this patient. Please do not hesitate to contact me with any further questions or concerns.

## 2024-11-20 ENCOUNTER — HOSPITAL ENCOUNTER (OUTPATIENT)
Dept: RADIOLOGY | Facility: HOSPITAL | Age: 64
Discharge: HOME | End: 2024-11-20
Payer: COMMERCIAL

## 2024-11-20 DIAGNOSIS — M85.80 OSTEOPENIA AFTER MENOPAUSE: ICD-10-CM

## 2024-11-20 DIAGNOSIS — Z78.0 OSTEOPENIA AFTER MENOPAUSE: ICD-10-CM

## 2024-11-20 PROCEDURE — 77080 DXA BONE DENSITY AXIAL: CPT | Performed by: RADIOLOGY

## 2024-11-20 PROCEDURE — 77080 DXA BONE DENSITY AXIAL: CPT

## 2025-01-28 ENCOUNTER — HOSPITAL ENCOUNTER (OUTPATIENT)
Dept: RADIOLOGY | Facility: HOSPITAL | Age: 65
Discharge: HOME | End: 2025-01-28
Payer: COMMERCIAL

## 2025-01-28 DIAGNOSIS — I25.10 CORONARY ARTERY CALCIFICATION: ICD-10-CM

## 2025-01-28 PROCEDURE — 75571 CT HRT W/O DYE W/CA TEST: CPT

## 2025-04-30 ENCOUNTER — APPOINTMENT (OUTPATIENT)
Dept: PRIMARY CARE | Facility: CLINIC | Age: 65
End: 2025-04-30
Payer: COMMERCIAL

## 2025-06-02 ENCOUNTER — TELEPHONE (OUTPATIENT)
Dept: PRIMARY CARE | Facility: CLINIC | Age: 65
End: 2025-06-02
Payer: COMMERCIAL

## 2025-06-02 NOTE — TELEPHONE ENCOUNTER
----- Message from Jordyn Franklin D.O. sent at 3/28/2018 11:18 AM PDT -----  Please advise pt that the bone scan does show osteopenia along with a decrease in bone mass of the femur. Patient needs an appointment with Tom who is her primary care provider for discussion on possible medication.    Jordyn Franklin D.O.     Call placed to patient updating that fasting labs need to be completed prior to appt on 6/10/25 and refills will be addressed then. Patient stated understanding and inquired about adding A1C level to labs d/t family history of diabetes.

## 2025-06-03 NOTE — TELEPHONE ENCOUNTER
"Call placed to patient to update on provider message, \"Please let Isi know that we can get an idea about diabetes if she fasts (water or black coffee only) a full 12 hours for her blood test.  If her glucose level is elevated, we can check an A1c in the office when she is here.\"   Patient stated that its the NP with [employer] that is requesting the A1C to be done with her current lab work d/t family history.      "

## 2025-06-07 LAB
25(OH)D3+25(OH)D2 SERPL-MCNC: 44 NG/ML (ref 30–100)
ALBUMIN SERPL-MCNC: 4.2 G/DL (ref 3.6–5.1)
ALP SERPL-CCNC: 70 U/L (ref 37–153)
ALT SERPL-CCNC: 11 U/L (ref 6–29)
ANION GAP SERPL CALCULATED.4IONS-SCNC: 11 MMOL/L (CALC) (ref 7–17)
AST SERPL-CCNC: 17 U/L (ref 10–35)
BASOPHILS # BLD AUTO: 52 CELLS/UL (ref 0–200)
BASOPHILS NFR BLD AUTO: 0.5 %
BILIRUB SERPL-MCNC: 1 MG/DL (ref 0.2–1.2)
BUN SERPL-MCNC: 14 MG/DL (ref 7–25)
CALCIUM SERPL-MCNC: 9.7 MG/DL (ref 8.6–10.4)
CHLORIDE SERPL-SCNC: 100 MMOL/L (ref 98–110)
CHOLEST SERPL-MCNC: 224 MG/DL
CHOLEST/HDLC SERPL: 4.6 (CALC)
CO2 SERPL-SCNC: 29 MMOL/L (ref 20–32)
CREAT SERPL-MCNC: 0.48 MG/DL (ref 0.5–1.05)
EGFRCR SERPLBLD CKD-EPI 2021: 104 ML/MIN/1.73M2
EOSINOPHIL # BLD AUTO: 196 CELLS/UL (ref 15–500)
EOSINOPHIL NFR BLD AUTO: 1.9 %
ERYTHROCYTE [DISTWIDTH] IN BLOOD BY AUTOMATED COUNT: 13.3 % (ref 11–15)
GLUCOSE SERPL-MCNC: 101 MG/DL (ref 65–99)
HCT VFR BLD AUTO: 49.9 % (ref 35–45)
HDLC SERPL-MCNC: 49 MG/DL
HGB BLD-MCNC: 16.2 G/DL (ref 11.7–15.5)
LDLC SERPL CALC-MCNC: 138 MG/DL (CALC)
LYMPHOCYTES # BLD AUTO: 3059 CELLS/UL (ref 850–3900)
LYMPHOCYTES NFR BLD AUTO: 29.7 %
MAGNESIUM SERPL-MCNC: 1.9 MG/DL (ref 1.5–2.5)
MCH RBC QN AUTO: 31 PG (ref 27–33)
MCHC RBC AUTO-ENTMCNC: 32.5 G/DL (ref 32–36)
MCV RBC AUTO: 95.6 FL (ref 80–100)
MONOCYTES # BLD AUTO: 597 CELLS/UL (ref 200–950)
MONOCYTES NFR BLD AUTO: 5.8 %
NEUTROPHILS # BLD AUTO: 6396 CELLS/UL (ref 1500–7800)
NEUTROPHILS NFR BLD AUTO: 62.1 %
NONHDLC SERPL-MCNC: 175 MG/DL (CALC)
PLATELET # BLD AUTO: 301 THOUSAND/UL (ref 140–400)
PMV BLD REES-ECKER: 10.2 FL (ref 7.5–12.5)
POTASSIUM SERPL-SCNC: 4.4 MMOL/L (ref 3.5–5.3)
PROT SERPL-MCNC: 6.9 G/DL (ref 6.1–8.1)
RBC # BLD AUTO: 5.22 MILLION/UL (ref 3.8–5.1)
SODIUM SERPL-SCNC: 140 MMOL/L (ref 135–146)
TRIGL SERPL-MCNC: 228 MG/DL
TSH SERPL-ACNC: 1.63 MIU/L (ref 0.4–4.5)
WBC # BLD AUTO: 10.3 THOUSAND/UL (ref 3.8–10.8)

## 2025-06-08 DIAGNOSIS — R73.01 IFG (IMPAIRED FASTING GLUCOSE): Primary | ICD-10-CM

## 2025-06-10 ENCOUNTER — APPOINTMENT (OUTPATIENT)
Dept: PRIMARY CARE | Facility: CLINIC | Age: 65
End: 2025-06-10
Payer: COMMERCIAL

## 2025-06-10 VITALS
HEIGHT: 59 IN | HEART RATE: 80 BPM | TEMPERATURE: 97.7 F | OXYGEN SATURATION: 92 % | WEIGHT: 214 LBS | DIASTOLIC BLOOD PRESSURE: 66 MMHG | SYSTOLIC BLOOD PRESSURE: 132 MMHG | BODY MASS INDEX: 43.14 KG/M2

## 2025-06-10 DIAGNOSIS — E66.01 OBESITY, MORBID (MULTI): ICD-10-CM

## 2025-06-10 DIAGNOSIS — Z71.2 ENCOUNTER TO DISCUSS TEST RESULTS: ICD-10-CM

## 2025-06-10 DIAGNOSIS — R60.0 LOCALIZED EDEMA: ICD-10-CM

## 2025-06-10 DIAGNOSIS — F41.8 SITUATIONAL ANXIETY: ICD-10-CM

## 2025-06-10 DIAGNOSIS — J44.9 CHRONIC OBSTRUCTIVE PULMONARY DISEASE, UNSPECIFIED COPD TYPE (MULTI): ICD-10-CM

## 2025-06-10 DIAGNOSIS — F17.200 TOBACCO DEPENDENCY: ICD-10-CM

## 2025-06-10 DIAGNOSIS — E78.2 MIXED HYPERLIPIDEMIA: ICD-10-CM

## 2025-06-10 DIAGNOSIS — J43.9 MILD EMPHYSEMA (MULTI): ICD-10-CM

## 2025-06-10 DIAGNOSIS — Z00.00 MEDICARE ANNUAL WELLNESS VISIT, SUBSEQUENT: Primary | ICD-10-CM

## 2025-06-10 DIAGNOSIS — K21.9 GASTROESOPHAGEAL REFLUX DISEASE WITHOUT ESOPHAGITIS: ICD-10-CM

## 2025-06-10 DIAGNOSIS — I10 ESSENTIAL HYPERTENSION: ICD-10-CM

## 2025-06-10 DIAGNOSIS — Z76.0 ENCOUNTER FOR MEDICATION REFILL: ICD-10-CM

## 2025-06-10 DIAGNOSIS — Z00.00 ROUTINE GENERAL MEDICAL EXAMINATION AT HEALTH CARE FACILITY: ICD-10-CM

## 2025-06-10 PROCEDURE — 1159F MED LIST DOCD IN RCRD: CPT | Performed by: FAMILY MEDICINE

## 2025-06-10 PROCEDURE — G0444 DEPRESSION SCREEN ANNUAL: HCPCS | Performed by: FAMILY MEDICINE

## 2025-06-10 PROCEDURE — 3288F FALL RISK ASSESSMENT DOCD: CPT | Performed by: FAMILY MEDICINE

## 2025-06-10 PROCEDURE — 3008F BODY MASS INDEX DOCD: CPT | Performed by: FAMILY MEDICINE

## 2025-06-10 PROCEDURE — 1160F RVW MEDS BY RX/DR IN RCRD: CPT | Performed by: FAMILY MEDICINE

## 2025-06-10 PROCEDURE — G0439 PPPS, SUBSEQ VISIT: HCPCS | Performed by: FAMILY MEDICINE

## 2025-06-10 PROCEDURE — 1170F FXNL STATUS ASSESSED: CPT | Performed by: FAMILY MEDICINE

## 2025-06-10 PROCEDURE — 1124F ACP DISCUSS-NO DSCNMKR DOCD: CPT | Performed by: FAMILY MEDICINE

## 2025-06-10 PROCEDURE — 3075F SYST BP GE 130 - 139MM HG: CPT | Performed by: FAMILY MEDICINE

## 2025-06-10 PROCEDURE — 99214 OFFICE O/P EST MOD 30 MIN: CPT | Performed by: FAMILY MEDICINE

## 2025-06-10 PROCEDURE — 3078F DIAST BP <80 MM HG: CPT | Performed by: FAMILY MEDICINE

## 2025-06-10 RX ORDER — ATORVASTATIN CALCIUM 10 MG/1
10 TABLET, FILM COATED ORAL
Qty: 20 TABLET | Refills: 3 | Status: SHIPPED | OUTPATIENT
Start: 2025-06-15 | End: 2025-06-10

## 2025-06-10 RX ORDER — PANTOPRAZOLE SODIUM 40 MG/1
40 TABLET, DELAYED RELEASE ORAL
Qty: 90 TABLET | Refills: 3 | Status: SHIPPED | OUTPATIENT
Start: 2025-06-10

## 2025-06-10 RX ORDER — LOSARTAN POTASSIUM AND HYDROCHLOROTHIAZIDE 25; 100 MG/1; MG/1
1 TABLET ORAL DAILY
Qty: 90 TABLET | Refills: 3 | Status: SHIPPED | OUTPATIENT
Start: 2025-06-10

## 2025-06-10 RX ORDER — ATORVASTATIN CALCIUM 10 MG/1
10 TABLET, FILM COATED ORAL
Qty: 20 TABLET | Refills: 0 | Status: SHIPPED | OUTPATIENT
Start: 2025-06-15

## 2025-06-10 RX ORDER — FAMOTIDINE 20 MG/1
20 TABLET, FILM COATED ORAL 2 TIMES DAILY PRN
Qty: 180 TABLET | Refills: 1 | Status: SHIPPED | OUTPATIENT
Start: 2025-06-10 | End: 2025-12-07

## 2025-06-10 RX ORDER — FUROSEMIDE 20 MG/1
20 TABLET ORAL DAILY PRN
Qty: 30 TABLET | Refills: 1 | Status: SHIPPED | OUTPATIENT
Start: 2025-06-10

## 2025-06-10 RX ORDER — HYDROXYZINE HYDROCHLORIDE 10 MG/1
10 TABLET, FILM COATED ORAL EVERY 4 HOURS PRN
Qty: 30 TABLET | Refills: 0 | Status: SHIPPED | OUTPATIENT
Start: 2025-06-10 | End: 2025-07-10

## 2025-06-10 RX ORDER — IBUPROFEN 200 MG
1 TABLET ORAL EVERY 24 HOURS
Qty: 30 PATCH | Refills: 0 | Status: SHIPPED | OUTPATIENT
Start: 2025-06-10 | End: 2025-07-10

## 2025-06-10 ASSESSMENT — PATIENT HEALTH QUESTIONNAIRE - PHQ9
SUM OF ALL RESPONSES TO PHQ9 QUESTIONS 1 AND 2: 0
2. FEELING DOWN, DEPRESSED OR HOPELESS: NOT AT ALL
1. LITTLE INTEREST OR PLEASURE IN DOING THINGS: NOT AT ALL

## 2025-06-10 ASSESSMENT — ACTIVITIES OF DAILY LIVING (ADL)
MANAGING_FINANCES: INDEPENDENT
BATHING: INDEPENDENT
DRESSING: INDEPENDENT
DOING_HOUSEWORK: INDEPENDENT
TAKING_MEDICATION: INDEPENDENT
GROCERY_SHOPPING: INDEPENDENT

## 2025-06-10 NOTE — PATIENT INSTRUCTIONS
Quitting Smoking    Quitting smoking is the most important step you can take to improve your health. We're glad you have set a goal to improve your health.    Quit Smoking Resources    In addition to medications, use the STAR plan to help you successfully quit.   Stick with your quit date!   Tell friends, family, and coworkers your quit date. Request their understanding and support.  Anticipate and prepare for challenges. Some examples are withdrawal symptoms, being around others who smoke, and drinking alcohol.  Remove all tobacco products and paraphernalia from your environment. Make your home and vehicles smoke-free.    Free resources for additional support:  National tobacco quitline: 1-800-QUIT-NOW (1-112.173.9886).  SmokefreeTXT is a free text program to assist you in quitting. Visit https://www.smokefree.gov/smokefreetxt for more information.  Feel free to call your care manager at ([phone]) for additional support.

## 2025-06-10 NOTE — PROGRESS NOTES
Subjective :  Chief Complaint: Joanne Quiroga is an 65 y.o. female here for an annual wellness visit and general medical care and f/u.     HPI:  Here for an annual wellness visit.  Medications:  no concerns.  The famotidine   Diet:  discussed healthy guidelines  Exercise:  knee pain prevents a lot of exercise, Silver Sneakers, discussed healthy guidelines  Sleep:  not a good sleeper, only 3 hours a night.  Occasional naps.    Mood:  good, no depression, no suicidal ideation, has a good support network, Depression screening completed using the PHQ-2 screening tool.  See rooming flow sheet for documentation.  5 minutes spent.  Ophthalmology:  up to date  Dental:  needs an appointment  Advanced Directives:  no living will nor POA  Falls:  none, no balance concerns    REVIEW OF SYSTEMS:  Constitutional:  No fever nor chills.  No significant weight changes.    Eyes: No vision changes.  Sclerae clear.  ENT:  No hearing changes.  No nasal congestion.    Cardiovascular:  No chest pains, palpitations, or dyspnea on exertion.  Respiratory:  No cough or shortness of breath.  No wheezing.   GI:  No bowel habit changes. No nausea or vomiting.    MS:  No muscle pains.  Back and neck osteoarthritis.  Knee pains from arthritis.  Lymphatics:  Intermittent lower leg swelling bilaterally.    CNS:  No weakness.  Has numbness and tingling from sciatica possibly.  No gait change.  Psychiatric: No depression, no anxiety.  Mood is positive and appropriate.  Reports, however, she is getting more anxiety when she travels on the freeway.  Especially as a passenger.  Noticed her anxiety has gotten worse over the last 3 years.  Sometimes she gets physically sick if she gets too anxious.  Will be traveling soon and wonders if she can have a medicine for anxiety to use only as needed.      Nicotine:  Tapering down - at 1/3 ppd.              Review of Systems  All systems reviewed and negative except for what was mentioned in the  "HPI    Objective   /66 (BP Location: Left arm, Patient Position: Sitting, BP Cuff Size: Large adult)   Pulse 80   Temp 36.5 °C (97.7 °F) (Temporal)   Ht 1.499 m (4' 11\")   Wt 97.1 kg (214 lb)   SpO2 92% Comment: RA  BMI 43.22 kg/m²     Physical Exam  Constitutional:       Appearance: Normal appearance. She is obese.   HENT:      Head: Normocephalic and atraumatic.      Nose: Nose normal. No congestion.   Eyes:      Extraocular Movements: Extraocular movements intact.      Pupils: Pupils are equal, round, and reactive to light.   Cardiovascular:      Rate and Rhythm: Normal rate and regular rhythm.   Pulmonary:      Effort: Pulmonary effort is normal.   Musculoskeletal:      Cervical back: Neck supple.   Skin:     General: Skin is warm and dry.   Neurological:      General: No focal deficit present.      Mental Status: She is alert and oriented to person, place, and time.   Psychiatric:         Mood and Affect: Mood normal.         Behavior: Behavior normal.         Thought Content: Thought content normal.         Judgment: Judgment normal.         Imaging:  Imaging  No results found.    Cardiology, Vascular, and Other Imaging  No other imaging results found for the past 7 days       Labs reviewed:    Lab Results   Component Value Date    WBC 10.3 06/06/2025    RBC 5.22 (H) 06/06/2025    HGB 16.2 (H) 06/06/2025    HCT 49.9 (H) 06/06/2025    MCV 95.6 06/06/2025     06/06/2025    CHOL 224 (H) 06/06/2025    TRIG 228 (H) 06/06/2025    HDL 49 (L) 06/06/2025    ALT 11 06/06/2025    AST 17 06/06/2025     06/06/2025    K 4.4 06/06/2025     06/06/2025    CREATININE 0.48 (L) 06/06/2025    BUN 14 06/06/2025    CO2 29 06/06/2025    TSH 1.63 06/06/2025       Past Medical, Surgical, and Family History reviewed and updated in chart.    I have reviewed and reconciled the medication list with the patient today. Current Medications[1]     List of current healthcare providers:  Patient Care Team:  Rosita" TIGIST Singh MD as PCP - General  Rosita Singh MD as PCP - [facility] PCP  Jeanne Jackson MD as Cardiologist (Interventional Cardiology)     Assessment/Plan :  Problem List Items Addressed This Visit       BMI 40.0-44.9, adult (Multi)    Diet and exercise recommendations discussed.             Encounter for medication refill    Medication reviewed.  Refills given.         Encounter to discuss test results    Reviewed lab/testing results with the patient and provided patient education regarding the results.           Essential hypertension    Controlled.  Continue present management.         Relevant Medications    losartan-hydrochlorothiazide (Hyzaar) 100-25 mg tablet    Gastroesophageal reflux disease without esophagitis    Discussed medication choices for reflux.  Prescription given.         Relevant Medications    famotidine (Pepcid) 20 mg tablet    pantoprazole (ProtoNix) 40 mg EC tablet    Hyperlipidemia    Discussed medication choices for hyperlipidemia.  Prescription given.  Directions for use discussed and discussed possible side effects.         Relevant Medications    atorvastatin (Lipitor) 10 mg tablet (Start on 6/15/2025)    Other Relevant Orders    Lipid Panel    Medicare annual wellness visit, subsequent - Primary    Age appropriate preventive measures reviewed and discussed.  Diet and exercise recommendations discussed.             Mild emphysema (Multi)    Stable.  No change in management at this time.          Other Visit Diagnoses         Situational anxiety        Relevant Medications    hydrOXYzine HCL (Atarax) 10 mg tablet      Routine general medical examination at health care facility        Relevant Orders    1 Year Follow Up In Primary Care - Wellness Exam      Tobacco dependency        Relevant Medications    nicotine (Nicoderm CQ) 21 mg/24 hr patch      Localized edema        Relevant Medications    furosemide (Lasix) 20 mg tablet      Chronic  obstructive pulmonary disease, unspecified COPD type (Multi)          Obesity, morbid (Multi)          Body mass index (BMI) 40.0-44.9, adult (Multi)              The following health maintenance schedule was reviewed with the patient and provided in printed form in the after visit summary:  Health Maintenance   Topic Date Due    MMR Vaccines (1 of 1 - Standard series) Never done    Diabetes Screening  Never done    DTaP/Tdap/Td Vaccines (1 - Tdap) 03/12/2010    RSV High Risk: (Elderly (60+) or Pregnant Population) (1 - Risk 60-74 years 1-dose series) Never done    Pneumococcal Vaccine (2 of 2 - PPSV23, PCV20, or PCV21) 08/28/2021    Mammogram  06/12/2024    COVID-19 Vaccine (2 - 2024-25 season) 09/01/2024    Influenza Vaccine (Season Ended) 09/01/2025    Medicare Annual Wellness Visit (AWV)  06/11/2026    Lipid Panel  06/06/2030    Colorectal Cancer Screening  07/11/2033    Zoster Vaccines  Completed    Hepatitis C Screening  Completed    Bone Density Scan  Completed    HIB Vaccines  Aged Out    Hepatitis B Vaccines  Aged Out    IPV Vaccines  Aged Out    Hepatitis A Vaccines  Aged Out    Meningococcal Vaccine  Aged Out    Rotavirus Vaccines  Aged Out    HPV Vaccines  Aged Out    Welcome to Medicare Visit  Discontinued    Irritable Bowel Syndrome  Discontinued       Advance Care Planning   As above.        Reviewed lab/testing results with the patient and provided patient education regarding the results.  Continue current medications as listed  Follow up in one year for next wellness visit.    Tobacco Counseling  The patient smokes cigarettes and desires to quit.  We created the following quit plan:  Quit assistance referrals: no referrals given. Patient does not want a referral.         [1]   Current Outpatient Medications:     ascorbic acid (Vitamin C) 500 mg tablet, Take 1 tablet (500 mg) by mouth once daily. PRN, Disp: , Rfl:     cyanocobalamin (Vitamin B-12) 100 mcg tablet, Take 1 tablet (100 mcg) by mouth once  daily., Disp: , Rfl:     albuterol (Ventolin HFA) 90 mcg/actuation inhaler, Inhale 2 puffs every 4 hours if needed for wheezing or shortness of breath., Disp: 8 g, Rfl: 5    [START ON 6/15/2025] atorvastatin (Lipitor) 10 mg tablet, Take 1 tablet (10 mg) by mouth 1 (one) time per week., Disp: 20 tablet, Rfl: 0    famotidine (Pepcid) 20 mg tablet, Take 1 tablet (20 mg) by mouth 2 times a day as needed for heartburn., Disp: 180 tablet, Rfl: 1    furosemide (Lasix) 20 mg tablet, Take 1 tablet (20 mg) by mouth once daily as needed (As needed for swelling.)., Disp: 30 tablet, Rfl: 1    hydrOXYzine HCL (Atarax) 10 mg tablet, Take 1 tablet (10 mg) by mouth every 4 hours if needed for itching., Disp: 30 tablet, Rfl: 0    losartan-hydrochlorothiazide (Hyzaar) 100-25 mg tablet, Take 1 tablet by mouth once daily., Disp: 90 tablet, Rfl: 3    nicotine (Nicoderm CQ) 21 mg/24 hr patch, Place 1 patch over 24 hours on the skin once every 24 hours., Disp: 30 patch, Rfl: 0    pantoprazole (ProtoNix) 40 mg EC tablet, Take 1 tablet (40 mg) by mouth 2 times a day before meals. Do not crush, chew, or split. 30 minutes before meals, Disp: 90 tablet, Rfl: 3

## 2025-06-10 NOTE — ASSESSMENT & PLAN NOTE
Discussed medication choices for hyperlipidemia.  Prescription given.  Directions for use discussed and discussed possible side effects.

## 2025-06-13 DIAGNOSIS — F41.8 SITUATIONAL ANXIETY: ICD-10-CM

## 2025-06-16 RX ORDER — HYDROXYZINE HYDROCHLORIDE 10 MG/1
10 TABLET, FILM COATED ORAL EVERY 4 HOURS PRN
Qty: 30 TABLET | Refills: 0 | Status: SHIPPED | OUTPATIENT
Start: 2025-06-16 | End: 2025-07-16

## 2025-07-30 DIAGNOSIS — R60.0 LOCALIZED EDEMA: ICD-10-CM

## 2025-08-13 RX ORDER — FUROSEMIDE 20 MG/1
TABLET ORAL
Qty: 30 TABLET | Refills: 1 | Status: SHIPPED | OUTPATIENT
Start: 2025-08-13

## 2025-08-23 DIAGNOSIS — E78.2 MIXED HYPERLIPIDEMIA: ICD-10-CM

## 2025-08-25 RX ORDER — ATORVASTATIN CALCIUM 10 MG/1
10 TABLET, FILM COATED ORAL
Qty: 20 TABLET | Refills: 0 | Status: SHIPPED | OUTPATIENT
Start: 2025-08-25